# Patient Record
Sex: FEMALE | Race: WHITE | Employment: PART TIME | URBAN - METROPOLITAN AREA
[De-identification: names, ages, dates, MRNs, and addresses within clinical notes are randomized per-mention and may not be internally consistent; named-entity substitution may affect disease eponyms.]

---

## 2022-10-31 ENCOUNTER — HOSPITAL ENCOUNTER (OUTPATIENT)
Dept: PHYSICAL THERAPY | Age: 62
Setting detail: THERAPIES SERIES
Discharge: HOME | End: 2022-10-31
Payer: COMMERCIAL

## 2022-10-31 PROCEDURE — 97110 THERAPEUTIC EXERCISES: CPT

## 2022-10-31 PROCEDURE — 97162 PT EVAL MOD COMPLEX 30 MIN: CPT

## 2022-10-31 NOTE — PROGRESS NOTES
Physical Therapy: Initial Evaluation    Patient: Remington Wilder (3 y.o. female)   Examination Date:   Plan of Care Certification Period: 10/31/2022 to        :  1960 ;    Confirmed: Yes MRN: [id number]  CSN: [id number]   Insurance: Payor: Chata Tapia / Plan: [facility] / Product Type: *No Product type* /   Insurance ID: [id number] - ([facility]) Secondary Insurance (if applicable):    Referring Physician: MD Dr. Amanda Garcia   PCP: Anna Calloway MD Visits to Date/Visits Approved:   /      No Show/Cancelled Appts:   /       Medical Diagnosis: back spasm  low back pain low back pain, spasms  Treatment Diagnosis: low and mid back pain. PERTINENT MEDICAL HISTORY           Medical History:     Past Medical History:   Diagnosis Date    Thyroid disease      Surgical History:   Past Surgical History:   Procedure Laterality Date    THYROIDECTOMY  2013    at [address] [place] [address]       Medications:   Current Outpatient Medications:     levothyroxine (SYNTHROID) 100 MCG tablet, Take 100 mcg by mouth Daily. , Disp: , Rfl:     calcium carbonate-vitamin D (CALCIUM + D) 600-200 MG-UNIT TABS, Take 1,800 mg by mouth., Disp: , Rfl:     docusate sodium (COLACE) 100 MG capsule, Take 100 mg by mouth 2 times daily. , Disp: , Rfl:   Allergies: Atenolol, Codeine, Ptu [propylthiouracil], and Tapazole [methimazole]      SUBJECTIVE EXAMINATION      ,           Subjective History:    Subjective: intermittent mid/low back pain and LLE > RLE symptoms and tenderness. Additional Pertinent Hx (if applicable): onset 82JNY ago, intermittently. Happening more frequently. Notices it occurs with exertion.   L more than R.          Learning/Language: Learning  Does the patient/guardian have any barriers to learning?: No barriers  Will there be a co-learner?: No  What is the preferred language of the patient/guardian?: English  Is an  required?: No  How does the patient/guardian prefer to learn new concepts?: Listening, Reading, Demonstration     Pain Screening   Pain Screening  Patient Currently in Pain: No    Functional Status    Dominant Hand: : Right    Social History:  Social History  Lives With: Spouse  Type of Home: House    Occupation/Interests:  Occupation: Part time employment  Type of Occupation:  at [employer]    Prior Level of Function:    Independent        Current Level of Function:  independent      ADL Assistance: Independent  Homemaking Assistance: Independent  Homemaking Responsibilities: Yes  Ambulation Assistance: Independent  Transfer Assistance: Independent  Active : Yes    OBJECTIVE EXAMINATION   Restrictions:             Review of Systems:  Vision: Impaired  Hearing: Within functional limits  Overall Orientation Status: Within Normal Limits    VBI Screening / Lumbar Screening:   Bowel/bladder disturbances: No  Saddle anesthesia: No  Unexplained weight loss: No  Severe motor weakness: No  Stumbling or giving way while walking: No    Regional Screen:         Observations:       Palpation:   Lumbar Spine Palpation: trace tenderness at B PSIS. Non tender T spine. Neuro Screen:  Left Myotomes  Right Myotomes          Left LE Myotomes (Lower Quarter)  L1-2: Hip Flexion: Strong  L3-4: Knee Extension: Strong  L4-5: Ankle Dorsiflexion: Strong  S1-2: Plantarflexion: Strong  S2: Knee Flexion: Strong Right LE Myotomes (Lower Quarter)  L1-2: Hip Flexion: Strong  L3-4: Knee Extension: Strong  L4-5: Ankle Dorsiflexion: Strong  S1-2: Plantarflexion: Strong  S2: Knee Flexion: Strong       Sensation      Sensation  Overall Sensation Status:  (grossly intact to lt touch hips to ankles)     Left AROM  Right AROM          Grossly wfls UE, LE Grossly wfls UE, LE       Thoracic Assessment            Lumbar Assessment   AROM Lumbar Spine   Lumbar spine general AROM: flexion to ankle, extension 15deg, BSB wfls.        Trunk Strength      At least 4 extension       Special Tests: Special Tests Lumbar Spine  Lumbar Special Tests:  (neg spring test, SI compression, distraction)  Special Tests for Hip  Hip Special Tests Performed:  (neg MILENA, FADIR, SLR, tightness with prone knee flexion.)    Balance/Gait Assessment(s) Performed: Additional Finding(s) (if applicable):           ASSESSMENT     Impression: Assessment: Pt presents with complaints of mid back more than low back onset 10yrs ago. Pains are episodic, typically linked to activities. She complains of spasms at her mid back, intermittent low back pain and L>R LE symptoms and tenderness. She has some tightness at quads, low back, weakness at scapulae and core. Statement of Medical Necessity: Physical Therapy is both indicated and medically necessary as outlined in the POC to increase the likelihood of meeting the functionally related goals stated below. Patient's Activity Tolerance:        Patient's rehabilitation potential/prognosis is considered to be: Good    Factors which may impact rehabilitation potential include:          GOALS   Patient Goal(s): prevent pain  Short Term Goals Completed by   Goal Status                                                                   Long Term Goals Completed by 6 weeks Goal Status   I in home program.                                                                TREATMENT PLAN            Pt. actively involved in establishing Plan of Care and Goals: Yes  Patient/ Caregiver education and instruction:               Treatment may include any combination of the following:       Frequency / Duration:  Patient to be seen on hold, pt will call with any problems or to progress home program in the next 4 weeks. Therapist Signature: Kamlesh Brooks PT    Date: 68/19/8962     I certify that the above Therapy Services are being furnished while the patient is under my care. I agree with the treatment plan and certify that this therapy is necessary.       [facility] Signature:  ___________________________   Date:_______                                                                   Yuni Max MD        Physician Comments: _______________________________________________    Please sign and return to   [facility]. Please fax to the location listed below.  Hope Whitting[facility] for this referral!    [address], [address] [address]  Dept: [phone]  Loc: [phone]

## 2022-10-31 NOTE — FLOWSHEET NOTE
Outpatient Physical Therapy  [facility]           [x] Phone: [phone]   Fax: [phone]  Janine langston           [] Phone: [phone]   Fax: [phone]        Physical Therapy Daily Treatment Note  Date:  10/31/2022    Patient Name:  Rios Staples    :  1960  MRN: [id number]  Restrictions/Precautions: No data recorded      Diagnosis:   back spasm  low back pain Diagnosis: low back pain, spasms  Date of Injury/Surgery:   Treatment Diagnosis:  low and mid back pain. Insurance/Certification information: [place]  Referring Physician:  Selmer Leventhal, MD Dr. Alban Holder   PCP: Kirstin Beckett MD  Next Doctor Visit:    Plan of care signed (Y/N):  n  Outcome Measure:   Visit# / total visits:  1 /  Pain level: 0/10   Goals:     Patient goals: prevent pain     Long Term Goals  Time Frame for Long Term Goals : 6 weeks  Long Term Goal 1: I in home program.      Summary of Evaluation:  Assessment: Pt presents with complaints of mid back more than low back onset 10yrs ago. Pains are episodic, typically linked to activities. She complains of spasms at her mid back, intermittent low back pain and L>R LE symptoms and tenderness. She has some tightness at quads, low back, weakness at scapulae and core. Subjective:  See vijay         Any changes in Ambulatory Summary Sheet?   None        Objective:  See vijay   COVID screening questions were asked and patient attested that there had been no contact or symptoms        Exercises: (No more than 4 columns)   Exercise/Equipment Date 10/31/22 Date Date     Home program as below      WARM UP                     TABLE      Quad stretch      Prone shoulder Habd, Scaption, flexion     clamshells B     bridge Cross leg              STANDING      Hip 4 way      rows      Shoulder 3 way                                   PROPRIOCEPTION                                    MODALITIES                      Other Therapeutic Activities/Education:        Home Exercise Program: Access Code: [email or web address]  URL: [email or web address]  Date: 10/31/2022  Prepared by: Sarah Gr  Figure 4 Bridge - 1 x daily - 7 x weekly - 3 sets - 10 reps  Clamshell - 1 x daily - 7 x weekly - 3 sets - 10 reps  Supine Transversus Abdominis Bracing - Hands on Stomach - 1 x daily - 7 x weekly - 3 sets - 10 reps  Prone Shoulder Horizontal Abduction - 1 x daily - 7 x weekly - 3 sets - 10 reps  Prone Single Arm Shoulder Y - 1 x daily - 7 x weekly - 3 sets - 10 reps  Prone Shoulder Flexion - 1 x daily - 7 x weekly - 3 sets - 10 reps  Static Prone on Elbows - 7 x weekly - 120 hold      Manual Treatments:        Modalities:        Communication with other providers:        Assessment:  (Response towards treatment session) (Pain Rating)  Assessment: Pt presents with complaints of mid back more than low back onset 10yrs ago. Pains are episodic, typically linked to activities. She complains of spasms at her mid back, intermittent low back pain and L>R LE symptoms and tenderness. She has some tightness at quads, low back, weakness at scapulae and core.       Plan for Next Session:        Time In / Time Out:    8066/1518           Timed Code/Total Treatment Minutes:  18/50      Next Progress Note due:        Plan of Care Interventions:  [x] Therapeutic Exercise  [] Modalities:  [x] Therapeutic Activity     [] Ultrasound  [] Estim  [] Gait Training      [] Cervical Traction [] Lumbar Traction  [x] Neuromuscular Re-education    [] Cold/hotpack [] Iontophoresis   [x] Instruction in HEP      [] Vasopneumatic   [] Dry Needling    [x] Manual Therapy               [] Aquatic Therapy              Electronically signed by:  Сергей Samuel PT, 10/31/2022, 9:15 AM

## 2022-10-31 NOTE — PLAN OF CARE
Outpatient Physical Therapy                  [x] Phone: [phone]   Fax: [phone]    Pediatric Therapy                                    [] Phone: [phone]   Fax: [phone]  Pediatric [facility]                                      [] Phone: [phone]   Fax: [phone]      To: MD Dr. Anum Damian   From: Mook Lawrence, PT, PT     Patient: Klever Delgado       : 1960  Diagnosis: low back pain, spasms   Treatment Diagnosis: low and mid back pain. Date: 10/31/2022    Physical Therapy Certification/Re-Certification Form  Dear Dr Anum Yoder    The following patient has been evaluated for physical therapy services and for therapy to continue, Please review the attached evaluation and/or summary of the patient's plan of care, and verify that you agree therapy should continue by signing the attached document and sending it back to our office. Patient is a  56 yo female who presents with low, mid back pain which impacts on adls;patient's goal is toprevent pain ;patient reports that pain  limits activities including sleeping, lifting; PT to address patient's goals, impairments and activity limitations with skilled interventions checked in plan of care;patient's level of function prior to onset of pain was independent; did not observe any barriers to learning during PT eval; learning preferences include demonstration, practice, and handouts; patient expressed understanding of HEP; patient appears to be motivated to participate in an active PT program and to be compliant with HEP expectations;patient assisted in developing treatment plan and goals; no DME is currently being used;      Current functional level (based on Oswestry)   : 0%     Assessment:  Assessment: Pt presents with complaints of mid back more than low back onset 10yrs ago. Pains are episodic, typically linked to activities.   She complains of spasms at her mid back, intermittent low back pain and L>R LE symptoms and tenderness. She has some tightness at quads, low back, weakness at scapulae and core. Plan of Care/Treatment to date:  [x] Therapeutic Exercise    [] Aquatics:  [] Therapeutic Activity    [] Ultrasound  [] Elec Stimulation  [] Gait Training     [] Cervical Traction [] Lumbar Traction  [x] Neuromuscular Re-education [] Cold/hotpack [] Iontophoresis   [x] Instruction in HEP       [] Manual Therapy     [] vasopneumatic            [] Self care home management        []Dry needling trigger point point/pain management          Frequency/Duration: hold 4 weeks - pt to call with any problems or exercise progression. # Days per week: [] 1 day # Weeks: [] 1 week [] 5 weeks     [] 2 days   [] 2 weeks [] 6 weeks     [] 3 days   [] 3 weeks [] 7 weeks     [] 4 days   [] 4 weeks [] 8 weeks    Rehab Potential/Progress: [] Excellent [x] Good [] Fair  [] Poor     Goals:       Long Term Goals  Time Frame for Long Term Goals : 6 weeks  Long Term Goal 1: I in home program.  Electronically signed by:  Twyla Mcgregor PT,  10/31/2022, 9:15 AM              If you have any questions or concerns, please don't hesitate to call.   Thank you for your referral.      Physician Signature:_________________Date:____________Time: ________  By signing above, therapists plan is approved by physician

## 2025-07-10 ENCOUNTER — PROCEDURE VISIT (OUTPATIENT)
Age: 65
End: 2025-07-10

## 2025-07-10 DIAGNOSIS — R20.2 PARESTHESIA OF BILATERAL LEGS: ICD-10-CM

## 2025-07-10 DIAGNOSIS — G57.32 DEEP PERONEAL NEUROPATHY OF LEFT LOWER EXTREMITY: Primary | ICD-10-CM

## 2025-07-10 DIAGNOSIS — M62.830 MUSCLE SPASM OF BACK: ICD-10-CM

## 2025-07-10 NOTE — PROGRESS NOTES
EMG/NCS Bilateral Lower Extremities    Reason for referral/Clinical data:  Sophie presents today for bilateral lower extremity EMG to assess muscle spasms in the low back that seem to correlate with pain in the lower extremities.  She states that symptoms have been going on for many years in regards to the muscle spasms, however, she is more recently began to experience discomfort in the legs as well as paresthesias in the feet.    Refer to the Electrodiagnostic Data Sheet for normative values, specific techniques utilized for conductions, the numeric values obtained on nerve conductions, and specific muscles sampled for needle examination.   Informed consent was given by the patient after discussion of the following - Expected potential benefits of procedure include the following: identifying diagnoses, excluding diagnoses, and helping the treating practitioners to prescribe treatment, testing, and follow-up. Possible adverse events of electrodiagnostic testing include local discomfort, mild bleeding or bruising (common) and rare/uncommon events such as infection, nausea, fainting, or other idiosyncratic adverse events.    Motor studies:  -- Right tibial motor response demonstrates amplitude which is normal, distal latency which is normal, and conduction velocity which is normal.   -- Left tibial motor response demonstrates amplitude which is normal, distal latency which is normal, and conduction velocity which is normal.  -- Right peroneal motor response demonstrates amplitude which is normal, distal latency which is normal, and conduction velocity which is normal.   -- Left peroneal motor response demonstrates amplitude which is mildly reduced, distal latency which is normal, and conduction velocity which is normal.     Sensory studies:  -- Right Superficial Peroneal sensory nerve conduction response demonstrates amplitude which is normal, and distal latency which is normal.   -- Left Superficial Peroneal

## 2025-07-28 RX ORDER — ROSUVASTATIN CALCIUM 10 MG/1
10 TABLET, COATED ORAL DAILY
COMMUNITY